# Patient Record
Sex: FEMALE | URBAN - METROPOLITAN AREA
[De-identification: names, ages, dates, MRNs, and addresses within clinical notes are randomized per-mention and may not be internally consistent; named-entity substitution may affect disease eponyms.]

---

## 2017-04-06 ASSESSMENT — ENCOUNTER SYMPTOMS
POOR WOUND HEALING: 0
DECREASED CONCENTRATION: 1
PANIC: 0
EYE PAIN: 0
DEPRESSION: 0
EYE IRRITATION: 0
INSOMNIA: 0
EYE WATERING: 0
NERVOUS/ANXIOUS: 1
EYE REDNESS: 1
SKIN CHANGES: 0
NAIL CHANGES: 0
DOUBLE VISION: 0

## 2017-04-06 ASSESSMENT — ANXIETY QUESTIONNAIRES
GAD7 TOTAL SCORE: 2
7. FEELING AFRAID AS IF SOMETHING AWFUL MIGHT HAPPEN: 0 = NOT AT ALL
GAD7 TOTAL SCORE: 2

## 2017-04-07 ASSESSMENT — ANXIETY QUESTIONNAIRES: GAD7 TOTAL SCORE: 2

## 2017-04-19 NOTE — PROGRESS NOTES
Alicia is a 51 year old female who presents for annual exam:   HPI:  Concerns:  - wants cholesterol checked:   - wt down: working out and watching diet  - Still worries about her cardiovascular health   Wt Readings from Last 5 Encounters:   17 70.1 kg (154 lb 8 oz)   16 74.1 kg (163 lb 6.4 oz)   16 75.8 kg (167 lb)   04/07/15 77.1 kg (170 lb)   ROS:  General: generally good health  Head/Eyes: none  Ears/Nose/Throat: eyes are alwaysdry  Cardiovascular: worries about cardiovascular healthRespiratory: none  Gastrointestinal: good when eating well   Breast: none  Genitourinary: none  Sexual Function: none  Musculoskeletal: leg are restless during the night  Skin: none  Neurological: none  Mental Health:Wakes with some anxiety. Brain does not process as well.     Endocrine: none  OB/GYN HISTORY:   LMP 3.5.. Few hot flashes.   D & C for uterine Polyps X 3.  Obstetric History       T4      TAB0   SAB0   E0   M0   L0       # Outcome Date GA Lbr Todd/2nd Weight Sex Delivery Anes PTL Lv   6 AB            5 AB            4 Term            3 Term            2 Term            1 Term               PAST MEDICAL HISTORY:  Past Medical History:   Diagnosis Date     SAMUEL (generalised anxiety disorder)      Herniated disc, cervical      TMJ arthralgia    Life Style Modifiers:   Tobacco:  reports that she has never smoked. She has never used smokeless tobacco.   Alcohol:  reports that she drinks alcohol.   Drug use:  reports that she does not use illicit drugs.  Exercise: trying - 3-4 times a week              Diet: good general diet.  Limits carbs/sugar.  HCM: GYN [[facility]]. Pap smear ~ negative 2015 will be seen again this year;  Mammogram 2016.   Supplements: vitamin D, Vitamin C  PAST SURGICAL HISTORY:  Past Surgical History:   Procedure Laterality Date     DILATION AND CURETTAGE      X3 for uterine polyp. , ,    FAMILY HISTORY:  No breast or ovarian disease  SOCIAL  "HISTORY:  Moved here in 2010 from [place].  Husbands work. 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 Living down [facility].  Four children  22,20, 18, 14. [three at home at this time].   MEDICATIONS:  Current Outpatient Prescriptions   Medication Sig Dispense Refill     tretinoin (RETIN-A) 0.025 % cream Start every third day, increase to every other day, and then every night as your skin tolerates 20 g 3   ALLERGIES:  Review of patient's allergies indicates no known allergies.  VITALS:  /79  Pulse 74  Ht 1.626 m (5' 4\")  Wt 70.1 kg (154 lb 8 oz)  LMP 03/05/2015  Breastfeeding? No  BMI 26.52 kg/m2  PHYSICAL EXAM:  Constitutional: Well appearing woman in no acute distress.   Psychological: appropriate mood.  Eyes: anicteric, normal extra-ocular movements.  Ears, Nose and Throat: tympanic membranes clear, nose clear and free of lesions, throat clear, moist mucous membrames, neck supple with full range of motion.    Neck: No thyroidmegaly.  Breast: symmetrical without abnormal masses and no nipple changes. No axillary lymphadenapthy  Cardiovascular: regular rate and rhythm, normal S1 and S2, no murmurs, rubs or gallops, peripheral pulses full and symmetric   Respiratory: clear to auscultation, no wheezes or crackles, normal breath sounds.  Musculoskeletal: full range of motion    Skin: no concerning lesions, no jaundice.  Neurological: normal gait, no tremor.   Diagnoses and associated orders for this visit:  Well woman exam without gynaecological exam  - Mammogram next month is scheduled  - Pelvic exam with GYN -   [[facility]]  - Immunizations up to date   - Weight management and exercise discussed - goal  <150  Postmenopausal:   - Vitamin D 1-2000 IU daily  - PAP/HPV in 12/2016 at GYN  Screening for diabetes mellitus  Orders:  -     Basic Metabolic Panel  Elevated cholesterol/Hyperlipidemia  Orders:  -     Lipid Panel  -     REFERRAL TO [facility] FOR CARDIOVASCULAR DISEASE " PREVN

## 2017-04-20 ENCOUNTER — OFFICE VISIT (OUTPATIENT)
Dept: FAMILY MEDICINE | Facility: CLINIC | Age: 52
End: 2017-04-20
Attending: FAMILY MEDICINE
Payer: COMMERCIAL

## 2017-04-20 ENCOUNTER — RADIANT APPOINTMENT (OUTPATIENT)
Dept: MAMMOGRAPHY | Facility: CLINIC | Age: 52
End: 2017-04-20
Payer: COMMERCIAL

## 2017-04-20 VITALS
HEIGHT: 64 IN | DIASTOLIC BLOOD PRESSURE: 79 MMHG | WEIGHT: 154.5 LBS | HEART RATE: 74 BPM | BODY MASS INDEX: 26.38 KG/M2 | SYSTOLIC BLOOD PRESSURE: 118 MMHG

## 2017-04-20 DIAGNOSIS — Z00.00 ANNUAL PHYSICAL EXAM: Primary | ICD-10-CM

## 2017-04-20 DIAGNOSIS — Z12.31 VISIT FOR SCREENING MAMMOGRAM: ICD-10-CM

## 2017-04-20 DIAGNOSIS — Z13.1 SCREENING FOR DIABETES MELLITUS: ICD-10-CM

## 2017-04-20 DIAGNOSIS — E78.5 HYPERLIPIDEMIA LDL GOAL <130: ICD-10-CM

## 2017-04-20 LAB
ANION GAP SERPL CALCULATED.3IONS-SCNC: 5 MMOL/L (ref 3–14)
BUN SERPL-MCNC: 9 MG/DL (ref 7–30)
CALCIUM SERPL-MCNC: 9.2 MG/DL (ref 8.5–10.1)
CHLORIDE SERPL-SCNC: 104 MMOL/L (ref 94–109)
CHOLEST SERPL-MCNC: 235 MG/DL
CO2 SERPL-SCNC: 33 MMOL/L (ref 20–32)
CREAT SERPL-MCNC: 0.64 MG/DL (ref 0.52–1.04)
GFR SERPL CREATININE-BSD FRML MDRD: ABNORMAL ML/MIN/1.7M2
GLUCOSE SERPL-MCNC: 91 MG/DL (ref 70–99)
HDLC SERPL-MCNC: 87 MG/DL
LDLC SERPL CALC-MCNC: 134 MG/DL
NONHDLC SERPL-MCNC: 148 MG/DL
POTASSIUM SERPL-SCNC: 3.5 MMOL/L (ref 3.4–5.3)
SODIUM SERPL-SCNC: 142 MMOL/L (ref 133–144)
TRIGL SERPL-MCNC: 70 MG/DL

## 2017-04-20 PROCEDURE — 80061 LIPID PANEL: CPT | Performed by: FAMILY MEDICINE

## 2017-04-20 PROCEDURE — 99213 OFFICE O/P EST LOW 20 MIN: CPT | Mod: ZF

## 2017-04-20 PROCEDURE — 80048 BASIC METABOLIC PNL TOTAL CA: CPT | Performed by: FAMILY MEDICINE

## 2017-04-20 PROCEDURE — 36415 COLL VENOUS BLD VENIPUNCTURE: CPT | Performed by: FAMILY MEDICINE

## 2017-04-20 PROCEDURE — G0202 SCR MAMMO BI INCL CAD: HCPCS

## 2017-04-20 ASSESSMENT — PAIN SCALES - GENERAL: PAINLEVEL: NO PAIN (0)

## 2017-04-20 NOTE — MR AVS SNAPSHOT
After Visit Summary   4/20/2017    Alicia Rivas    MRN: [id number]           Patient Information     Date Of Birth          1965        Visit Information        Provider Department      4/20/2017 8:00 AM Chari Luna MD [facility]        Today's Diagnoses     Annual physical exam    -  1    Screening for diabetes mellitus        Hyperlipidemia LDL goal <130           Follow-ups after your visit        Additional Services     REFERRAL TO [facility] FOR CARDIOVASCULAR DISEASE PREVN       Your provider has referred you to the [facility].    To schedule your appointment: The [facility] Central Scheduling Center will call you to schedule your appointment.    Please be aware that coverage of these services is subject to the terms and limitations of your health insurance plan.  Call member services at your health plan with any benefit or coverage questions.      Please bring the following with you to your appointment:    (1) Any X-Rays, CTs or MRIs which have been performed.  Contact the facility where they were done to arrange for  prior to your scheduled appointment.  Any new CT, MRI or other procedures ordered by your specialist must be performed at a [facility] facility or coordinated by your clinic's referral office.    (2) List of current medications   (3) This referral request   (4) Any documents/labs given to you for this referral                  Your next 10 appointments already scheduled     May 30, 2017  8:00 AM CDT   MA SCREENING DIGITAL BILATERAL with [id number]   [facility] ([facility])    [address] [place] [address], [address]  [facility] [address] [phone]           Do not use any powder, lotion or deodorant under your arms or on your breast. If you do, we will ask you to remove it before your exam.  Wear comfortable, two-piece clothing.  If you have any allergies, tell your care team.   "Bring any previous mammograms from other facilities or have them mailed to the breast center.              Who to contact     Please call your clinic at [phone] to:    Ask questions about your health    Make or cancel appointments    Discuss your medicines    Learn about your test results    Speak to your doctor   If you have compliments or concerns about an experience at your clinic, or if you wish to file a complaint, please contact [facility] Patient Relations at [phone] or email us at [email or web address]         Additional Information About Your Visit        [employer] Information     [employer] gives you secure access to your electronic health record. If you see a primary care provider, you can also send messages to your care team and make appointments. If you have questions, please call your primary care clinic.  If you do not have a primary care provider, please call [phone] and they will assist you.      [employer] is an electronic gateway that provides easy, online access to your medical records. With [employer], you can request a clinic appointment, read your test results, renew a prescription or communicate with your care team.     To access your existing account, please contact your [facility] or call [phone] for assistance.        Care EveryWhere ID     This is your Care EveryWhere ID. This could be used by other organizations to access your [facility] medical records  [id number]        Your Vitals Were     Pulse Height Last Period Breastfeeding? BMI (Body Mass Index)       74 1.626 m (5' 4\") 03/05/2015 No 26.52 kg/m2        Blood Pressure from Last 3 Encounters:   04/20/17 118/79   04/19/16 111/71   03/07/16 116/73    Weight from Last 3 Encounters:   04/20/17 70.1 kg (154 lb 8 oz)   04/19/16 74.1 kg (163 lb 6.4 oz)   03/07/16 75.8 kg (167 lb)              We Performed the Following     Basic Metabolic Panel     Lipid Profile  "    REFERRAL TO [facility] FOR CARDIOVASCULAR DISEASE PREV        Primary Care Provider    Pcp Unknown Verified       No address on file        Thank you!     Thank you for choosing [facility]  for your care. Our goal is always to provide you with excellent care. Hearing back from our patients is one way we can continue to improve our services. Please take a few minutes to complete the written survey that you may receive in the mail after your visit with us. Thank you!             Your Updated Medication List - Protect others around you: Learn how to safely use, store and throw away your medicines at www.disposemymeds.org.          This list is accurate as of: 4/20/17  8:37 AM.  Always use your most recent med list.                   Brand Name Dispense Instructions for use    tretinoin 0.025 % cream    RETIN-A    20 g    Start every third day, increase to every other day, and then every night as your skin tolerates

## 2017-04-20 NOTE — LETTER
2017       RE: Alicia Rivas  [address]  [place] [address]     Dear Colleague,    Thank you for referring your patient, Alicia Rivas, to the [facility] at [facility]. Please see a copy of my visit note below.    Alicia is a 51 year old female who presents for annual exam:   HPI:  Concerns:  - wants cholesterol checked:   - wt down: working out and watching diet  - Still worries about her cardiovascular health   Wt Readings from Last 5 Encounters:   17 70.1 kg (154 lb 8 oz)   16 74.1 kg (163 lb 6.4 oz)   16 75.8 kg (167 lb)   04/07/15 77.1 kg (170 lb)   ROS:  General: generally good health  Head/Eyes: none  Ears/Nose/Throat: eyes are alwaysdry  Cardiovascular: worries about cardiovascular healthRespiratory: none  Gastrointestinal: good when eating well   Breast: none  Genitourinary: none  Sexual Function: none  Musculoskeletal: leg are restless during the night  Skin: none  Neurological: none  Mental Health:Wakes with some anxiety. Brain does not process as well.     Endocrine: none  OB/GYN HISTORY:   LMP 3.5.2015. Few hot flashes.   D & C for uterine Polyps X 3.  Obstetric History       T4      TAB0   SAB0   E0   M0   L0       # Outcome Date GA Lbr Todd/2nd Weight Sex Delivery Anes PTL Lv   6 AB            5 AB            4 Term            3 Term            2 Term            1 Term               PAST MEDICAL HISTORY:  Past Medical History:   Diagnosis Date     SAMUEL (generalised anxiety disorder)      Herniated disc, cervical      TMJ arthralgia    Life Style Modifiers:   Tobacco:  reports that she has never smoked. She has never used smokeless tobacco.   Alcohol:  reports that she drinks alcohol.   Drug use:  reports that she does not use illicit drugs.  Exercise: trying - 3-4 times a week              Diet: good general diet.  Limits carbs/sugar.  HCM: GYN [[facility]]. Pap smear ~  "negative 12/2015 will be seen again this year;  Mammogram 4/2016.   Supplements: vitamin D, Vitamin C  PAST SURGICAL HISTORY:  Past Surgical History:   Procedure Laterality Date     DILATION AND CURETTAGE      X3 for uterine polyp. 2001, 2008, 2014   FAMILY HISTORY:  No breast or ovarian disease  SOCIAL HISTORY:  Moved here in 2010 from [place].  Husbands work. 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 Living down [facility].  Four children  22,20, 18, 14. [three at home at this time].   MEDICATIONS:  Current Outpatient Prescriptions   Medication Sig Dispense Refill     tretinoin (RETIN-A) 0.025 % cream Start every third day, increase to every other day, and then every night as your skin tolerates 20 g 3   ALLERGIES:  Review of patient's allergies indicates no known allergies.  VITALS:  /79  Pulse 74  Ht 1.626 m (5' 4\")  Wt 70.1 kg (154 lb 8 oz)  LMP 03/05/2015  Breastfeeding? No  BMI 26.52 kg/m2  PHYSICAL EXAM:  Constitutional: Well appearing woman in no acute distress.   Psychological: appropriate mood.  Eyes: anicteric, normal extra-ocular movements.  Ears, Nose and Throat: tympanic membranes clear, nose clear and free of lesions, throat clear, moist mucous membrames, neck supple with full range of motion.    Neck: No thyroidmegaly.  Breast: symmetrical without abnormal masses and no nipple changes. No axillary lymphadenapthy  Cardiovascular: regular rate and rhythm, normal S1 and S2, no murmurs, rubs or gallops, peripheral pulses full and symmetric   Respiratory: clear to auscultation, no wheezes or crackles, normal breath sounds.  Musculoskeletal: full range of motion    Skin: no concerning lesions, no jaundice.  Neurological: normal gait, no tremor.   Diagnoses and associated orders for this visit:  Well woman exam without gynaecological exam  - Mammogram next month is scheduled  - Pelvic exam with GYN -   [[facility]]  - Immunizations up to date   - Weight management and " exercise discussed - goal  <150  Postmenopausal:   - Vitamin D 1-2000 IU daily  - PAP/HPV in 12/2016 at GYN  Screening for diabetes mellitus  Orders:  -     Basic Metabolic Panel  Elevated cholesterol/Hyperlipidemia  Orders:  -     Lipid Panel  -     REFERRAL TO [facility] FOR CARDIOVASCULAR DISEASE PREVN    Again, thank you for allowing me to participate in the care of your patient.      Sincerely,    Chari Luna MD

## 2017-05-12 DIAGNOSIS — E78.5 HYPERLIPIDEMIA LDL GOAL <100: Primary | ICD-10-CM

## 2017-05-15 ASSESSMENT — ENCOUNTER SYMPTOMS
ARTHRALGIAS: 0
EYE IRRITATION: 1
ORTHOPNEA: 0
SLEEP DISTURBANCES DUE TO BREATHING: 0
NECK PAIN: 1
EYE WATERING: 0
CLAUDICATION: 0
DOUBLE VISION: 0
TACHYCARDIA: 0
SYNCOPE: 0
MUSCLE CRAMPS: 1
MYALGIAS: 0
EYE REDNESS: 1
EXERCISE INTOLERANCE: 0
STIFFNESS: 0
MUSCLE WEAKNESS: 0
JOINT SWELLING: 0
LIGHT-HEADEDNESS: 0
LEG SWELLING: 0
BACK PAIN: 1
PALPITATIONS: 0
HYPERTENSION: 0
EYE PAIN: 0
LEG PAIN: 1
HYPOTENSION: 0

## 2017-05-18 ENCOUNTER — OFFICE VISIT (OUTPATIENT)
Dept: CARDIOLOGY | Facility: CLINIC | Age: 52
End: 2017-05-18

## 2017-05-18 VITALS
WEIGHT: 155 LBS | HEIGHT: 64 IN | SYSTOLIC BLOOD PRESSURE: 110 MMHG | DIASTOLIC BLOOD PRESSURE: 74 MMHG | HEART RATE: 66 BPM | BODY MASS INDEX: 26.46 KG/M2 | OXYGEN SATURATION: 98 %

## 2017-05-18 DIAGNOSIS — E78.5 HYPERLIPIDEMIA LDL GOAL <100: Primary | ICD-10-CM

## 2017-05-18 DIAGNOSIS — E78.5 HYPERLIPIDEMIA LDL GOAL <100: ICD-10-CM

## 2017-05-18 LAB
CREAT UR-MCNC: 96 MG/DL
CRP SERPL HS-MCNC: 1.8 MG/L
FEF 25/75: NORMAL
FEV-1: NORMAL
FEV1/FVC: NORMAL
FVC: NORMAL
INTERPRETATION ECG - MUSE: NORMAL
MICROALBUMIN UR-MCNC: 6 MG/L
MICROALBUMIN/CREAT UR: 6.34 MG/G CR (ref 0–25)
NT-PROBNP SERPL-MCNC: 50 PG/ML (ref 0–125)

## 2017-05-18 NOTE — LETTER
"5/18/2017      RE: Alicia Rivas  [address]  [place] [address]       Dear Colleague,    Thank you for the opportunity to participate in the care of your patient, Alicia Rivas, at the [facility] FOR CARDIOVASCULAR DISEASE PREVENTION at [facility]. Please see a copy of my visit note below.      [facility] for Cardiovascular Disease Prevention - Exam Note    Active Problems   Patient Active Problem List    Diagnosis Date Noted     Hyperlipidemia LDL goal <100 05/18/2017     Priority: Medium       Reason For Visit   Patient here for [facility] early detection of atherosclerosis and CVD exam.    Pain Evaluation  Current history of pain associated with this visit is: denied    HPI   Alicia Rivas is a 51 year old year old female with a history of hyperlipidemia identified two years ago with LDL elevation to  171 with above average HDL up to 93.  At the time she was following the whole thirty eating plan high in meat and eggs. Since that time she has reduced her intake of meat and eggs with a reduction in her LDL to 134. She it not taking any lipid medications. She had atypical chest pain symptoms about one year ago (non exertional, feeling of chest weakness for 24 hours) which was evaluated with a graded exercise test with normal result without ischemia.    She is concerned about her CV risk because her father had a 4 vessel CAB at age 71 but with a hx of angina many years prior.    Nutrition assessment per patient report:   We reviewed \"my plate\" nutrition recommendations. She works to avoid high sugar foods and has recently reduced her meat intake. Her TMJ symptoms interestingly improved with reduction of red meat, chew ice and gum. We discussed increasing foods rich in calcium like milk. She is taking a vitamin D supplement but could not recall the dose.  Foods with fat/cholesterol (fried foods, fatty meats, junk food):  1 " serving per day   Fruits and vegetables (  cup cooked, 1 cup raw):  4 servings per day  Caffeine (1 cup coffee, soda, etc):  0 servings  Alcohol servings (12 oz. beer, 4 oz. wine, 1  oz. in mixed drink):  2 servings per month  Calcium servings (dairy foods, 8 oz. milk, yogurt, cheese, ice cream): 1- 2 servings per day  Salt/sodium use:  light use  Special dietary habits:  1600 calori diet, she follows 1200 when she is working to reduce her weight    Activity  Patient is no recently very active with moving her home and starting a new job in the last 3 months. We discussed adding back her past routine of running or walking and yoga for strength training and flexibility.    Laboratory Results Review  We discussed laboratory results today including lipids targets and how foods influence cholesterol.    Weight  Her perceived healthy weight is 145 pounds.  A normal BMI of 25 is equal to 145 pounds.  The current BMI of 26.6 is overweight range.  A weight reduction speed of 1-2 lbs per month for women is recommended.    PMH   Past Medical History:   Diagnosis Date     Abnormal Pap smear      SAMUEL (generalised anxiety disorder)      Herniated disc, cervical      Hyperlipidemia 2015     TMJ arthralgia        PSH  Past Surgical History:   Procedure Laterality Date     DILATION AND CURETTAGE      X3 for uterine polyp. 2001, 2008, 2014       Current Meds   Current Outpatient Prescriptions   Medication Sig Dispense Refill     VITAMIN D, CHOLECALCIFEROL, PO Take 1 tablet by mouth daily         Allergies      Allergies   Allergen Reactions     Tretinoin Other (See Comments)     Bad dreams       Family Hx   Family History   Problem Relation Age of Onset     Skin Cancer Mother      Substance Abuse Mother      alcohol, in recovery     Depression Mother      OSTEOPOROSIS Mother      Asthma Mother      Skin Cancer Father      Coronary Artery Disease Father 60     quadruple bypass at 65     MENTAL ILLNESS Father      undiagnosed, onset  "early 50's     Substance Abuse Father      alcohol     Prostate Cancer Maternal Grandfather      Coronary Artery Disease Maternal Grandfather      Other - See Comments Sister      cranial fascial malformation     Alcoholism Paternal Grandmother      Neurologic Disorder Sister      cerebral palsy     CANCER Paternal Grandfather        Social History  Alicia is  and is working full time. Her job is [employer]  She is  with 4 children. 4 children are over 18 years of age, and 1 child is 5-18 years of age.     Enjoyment of life is 9 with 10 enjoys life fully.    Tobacco History  History   Smoking Status     Never Smoker   Smokeless Tobacco     Never Used       ROS  CONSTITUTIONAL:  No fever, chills, or sweats. No weight gain/loss.   EENT:  No visual disturbance, ear ache, epistaxis, sore throat  ALLERGIES/IMMUNOLOGIC:  Negative  RESPIRATORY:  No cough, hemoptysis  CARDIOVASCULAR:  As per HPI  GI:  No nausea, vomiting, hematemesis, melena  :  No urinary frequency, dysuria, or hematuria  INTEGUMENT:  Negative  PSYCHIATRIC:  Negative  NEURO:  Negative  ENDOCRINE:  Negative  MUSCULOSKELETAL:  Negative  MUSCULOSKELETAL:  Some back pain     Vital Signs   /74 (BP Location: Left arm, Patient Position: Chair, Cuff Size: Adult Regular)  Pulse 66  Ht 1.626 m (5' 4\")  Wt 70.3 kg (155 lb)  LMP 03/05/2015  SpO2 98%  BMI 26.61 kg/m2      Waist: 33 inches  Hip: 43 inches    Physical Exam   In general, the patient is a pleasant female in no apparent distress.    HEENT: NC/AT.  PERRLA.  EOMI.  Sclerae white, not injected.  Nares clear.  Pharynx without erythema or exudate.  Dentition intact.    Neck: No adenopathy.  No thyromegaly.Carotids +4/4 bilaterally without bruits.  No jugular venous distension.   Lungs: CTA.  No ronchi, wheezes, rales.     Cor: RRR. Normal S1, S2 splits physiologically. No murmur, rub, click, or gallop. The PMI is in the 5th ICS in the midclavicular line. There is no heave. "   Abdomen: Soft, nontender, nondistended. No organomegaly.  No bruits.   Extremities: No clubbing, cyanosis, or edema. The pulses are +2/2 at the post-tibial sites bilaterally. No bruits are noted.    Recent Labs  Lab Results   Component Value Date    GLC 91 04/20/2017      Lab Results   Component Value Date    NTBNP 50 05/18/2017     No results found for: NTBNPI   Lab Results   Component Value Date    UCRR 96 05/18/2017      Lab Results   Component Value Date    MICROL 6 05/18/2017      No results found for: MICROALBUMIN   No results found for: CRP   Lab Results   Component Value Date    CHOL 235 (H) 04/20/2017      Lab Results   Component Value Date    TRIG 70 04/20/2017      Lab Results   Component Value Date    HDL 87 04/20/2017      Lab Results   Component Value Date     (H) 04/20/2017      Lab Results   Component Value Date    VLDL 14 04/08/2015      Lab Results   Component Value Date    CHOLHDLRATIO 2.9 04/08/2015     Lab Results   Component Value Date    NHDL 148 (H) 04/20/2017          [facility] Test Results    BASIC SPIROMETRY: Summary of two attempts (see printout for details of results)  Results Estimated range for ht/age   FVC: 3.10 liter FVC: 2.87-4.25 liter   FEV1: 2.57 liter FEV1: 2.24-3.41 liter     History of asthma:  NO   History of respiratory infection current/recent:  NO     Spirometry Results:  normal      WALKING BLOOD PRESSURE RESPONSE (3 minute, 5 MET level walk)   Pre BP: 90/62 mmHg  3 min BP: 102/50 mmHg  1 min post BP: 102/50 mmHg    Pre HR: 62 bpm  3 min HR: 126 bpm  1 min post HR: 66 bpm       RETINAL VASCULAR ASSESSMENT   Left Eye Abnormality:  none  AV Ratio: 0.82    Right Eye Abnormality:  none  AV Ratio: 0.84     Retinal Assessment:  normal      ABDOMINAL AORTA ULTRASOUND (< 2.5 normal, borderline 2.5-2.9, abnormal > 3)   SupraIliac 1.35 cm    SupraRenal 1.63 cm    InfraRenal Proximal 1.54 cm    InfraRenal Distal 1.73 cm      Abdominal Aorta Assessment:  normal      LEFT  VENTRICULAR ULTRASOUND MEASUREMENTS (adjusted for BSA)  LVIDD 45.3 mm   Septa 8.8 mm   Posterior 10.5 mm     Left Ventricular US Assessment:  normal      Carotid Artery IMT measurements report and plaques in the small area examined:   Left IMT 0.639 mm  Plaques none    Right IMT 0.519 mm  Plaques none       ECG (see tracing):  normal sinus rhythm;  rate: 60 bpm      Arterial Elasticity per age and gender (see printout):   C1 20.5 mL/mmHg x 10  normal   C2 13 mL/mmHg x 100 normal   Supine blood pressure: 105/68 mmHg       Assessment:     Cardiovascular:  ECG sinus rhythm rate 60 without ectopy. No recent chest pain or shortness of breath. Chest pain one year ago was atypical with negative GXT. Nt pro BNP is normal.    Blood Pressure:  No hx of hypertension. Artery elasticity normal both large and small.      Lipids:  Elevated LDL up to 171 reduced to 134 with reduction of red meat. HDL is above average at 87, normal triglycerides without lipid medication.     Health Habit Summary:  Nutrition: Heart Healthy Eating:  most of the time   Exercise:  not regularly active last 3 mo recommend increasing activity level with walking, strength training once per week and stretching program. [place] with a gym available.  Weight:  overweight range  Tobacco Use:  never used    Full report to follow prevention team review of test results with scanned final report.    Time spent for patient visit was 60 minutes with more than half the time spent on counseling and coordination of care.    SOFYA Castro CNP       CC  Patient Care Team:  Verified, Pcp Unknown as PCP - General  Justice Orellana MD as MD (Family Practice)  Kerry Sosa MD PhD as MD (Family Practice)  Kourtney Canchola APRN CNP as Nurse Practitioner (Nurse Practitioner)  JUSTICE ORELLANA

## 2017-05-18 NOTE — PROGRESS NOTES
"  [facility] Prevention - Exam Note    Active Problems   Patient Active Problem List    Diagnosis Date Noted     Hyperlipidemia LDL goal <100 05/18/2017     Priority: Medium       Reason For Visit   Patient here for [facility] early detection of atherosclerosis and CVD exam.    Pain Evaluation  Current history of pain associated with this visit is: denied    HPI   Alicia Rivas is a 51 year old year old female with a history of hyperlipidemia identified two years ago with LDL elevation to  171 with above average HDL up to 93.  At the time she was following the whole thirty eating plan high in meat and eggs. Since that time she has reduced her intake of meat and eggs with a reduction in her LDL to 134. She it not taking any lipid medications. She had atypical chest pain symptoms about one year ago (non exertional, feeling of chest weakness for 24 hours) which was evaluated with a graded exercise test with normal result without ischemia.    She is concerned about her CV risk because her father had a 4 vessel CAB at age 71 but with a hx of angina many years prior.    Nutrition assessment per patient report:   We reviewed \"my plate\" nutrition recommendations. She works to avoid high sugar foods and has recently reduced her meat intake. Her TMJ symptoms interestingly improved with reduction of red meat, chew ice and gum. We discussed increasing foods rich in calcium like milk. She is taking a vitamin D supplement but could not recall the dose.  Foods with fat/cholesterol (fried foods, fatty meats, junk food):  1 serving per day   Fruits and vegetables (  cup cooked, 1 cup raw):  4 servings per day  Caffeine (1 cup coffee, soda, etc):  0 servings  Alcohol servings (12 oz. beer, 4 oz. wine, 1  oz. in mixed drink):  2 servings per month  Calcium servings (dairy foods, 8 oz. milk, yogurt, cheese, ice cream): 1- 2 servings per day  Salt/sodium use:  light use  Special dietary habits:  " 1600 calori diet, she follows 1200 when she is working to reduce her weight    Activity  Patient is no recently very active with moving her home and starting a new job in the last 3 months. We discussed adding back her past routine of running or walking and yoga for strength training and flexibility.    Laboratory Results Review  We discussed laboratory results today including lipids targets and how foods influence cholesterol.    Weight  Her perceived healthy weight is 145 pounds.  A normal BMI of 25 is equal to 145 pounds.  The current BMI of 26.6 is overweight range.  A weight reduction speed of 1-2 lbs per month for women is recommended.    PMH   Past Medical History:   Diagnosis Date     Abnormal Pap smear      SAMUEL (generalised anxiety disorder)      Herniated disc, cervical      Hyperlipidemia 2015     TMJ arthralgia        PSH  Past Surgical History:   Procedure Laterality Date     DILATION AND CURETTAGE      X3 for uterine polyp. 2001, 2008, 2014       Current Meds   Current Outpatient Prescriptions   Medication Sig Dispense Refill     VITAMIN D, CHOLECALCIFEROL, PO Take 1 tablet by mouth daily         Allergies      Allergies   Allergen Reactions     Tretinoin Other (See Comments)     Bad dreams       Family Hx   Family History   Problem Relation Age of Onset     Skin Cancer Mother      Substance Abuse Mother      alcohol, in recovery     Depression Mother      OSTEOPOROSIS Mother      Asthma Mother      Skin Cancer Father      Coronary Artery Disease Father 60     quadruple bypass at 65     MENTAL ILLNESS Father      undiagnosed, onset early 50's     Substance Abuse Father      alcohol     Prostate Cancer Maternal Grandfather      Coronary Artery Disease Maternal Grandfather      Other - See Comments Sister      cranial fascial malformation     Alcoholism Paternal Grandmother      Neurologic Disorder Sister      cerebral palsy     CANCER Paternal Grandfather        Social History  Alicia is   "and is working full time. Her job is [employer].  She is  with 4 children. 4 children are over 18 years of age, and 1 child is 5-18 years of age.     Enjoyment of life is 9 with 10 enjoys life fully.    Tobacco History  History   Smoking Status     Never Smoker   Smokeless Tobacco     Never Used       ROS  CONSTITUTIONAL:  No fever, chills, or sweats. No weight gain/loss.   EENT:  No visual disturbance, ear ache, epistaxis, sore throat  ALLERGIES/IMMUNOLOGIC:  Negative  RESPIRATORY:  No cough, hemoptysis  CARDIOVASCULAR:  As per HPI  GI:  No nausea, vomiting, hematemesis, melena  :  No urinary frequency, dysuria, or hematuria  INTEGUMENT:  Negative  PSYCHIATRIC:  Negative  NEURO:  Negative  ENDOCRINE:  Negative  MUSCULOSKELETAL:  Negative  MUSCULOSKELETAL:  Some back pain     Vital Signs   /74 (BP Location: Left arm, Patient Position: Chair, Cuff Size: Adult Regular)  Pulse 66  Ht 1.626 m (5' 4\")  Wt 70.3 kg (155 lb)  LMP 03/05/2015  SpO2 98%  BMI 26.61 kg/m2      Waist: 33 inches  Hip: 43 inches    Physical Exam   In general, the patient is a pleasant female in no apparent distress.    HEENT: NC/AT.  PERRLA.  EOMI.  Sclerae white, not injected.  Nares clear.  Pharynx without erythema or exudate.  Dentition intact.    Neck: No adenopathy.  No thyromegaly.Carotids +4/4 bilaterally without bruits.  No jugular venous distension.   Lungs: CTA.  No ronchi, wheezes, rales.     Cor: RRR. Normal S1, S2 splits physiologically. No murmur, rub, click, or gallop. The PMI is in the 5th ICS in the midclavicular line. There is no heave.   Abdomen: Soft, nontender, nondistended. No organomegaly.  No bruits.   Extremities: No clubbing, cyanosis, or edema. The pulses are +2/2 at the post-tibial sites bilaterally. No bruits are noted.    Recent Labs  Lab Results   Component Value Date    GLC 91 04/20/2017      Lab Results   Component Value Date    NTBNP 50 05/18/2017     No results found for: NTBNPI   Lab Results "   Component Value Date    UCRR 96 05/18/2017      Lab Results   Component Value Date    MICROL 6 05/18/2017      No results found for: MICROALBUMIN   No results found for: CRP   Lab Results   Component Value Date    CHOL 235 (H) 04/20/2017      Lab Results   Component Value Date    TRIG 70 04/20/2017      Lab Results   Component Value Date    HDL 87 04/20/2017      Lab Results   Component Value Date     (H) 04/20/2017      Lab Results   Component Value Date    VLDL 14 04/08/2015      Lab Results   Component Value Date    CHOLHDLRATIO 2.9 04/08/2015     Lab Results   Component Value Date    NHDL 148 (H) 04/20/2017          [facility] Test Results    BASIC SPIROMETRY: Summary of two attempts (see printout for details of results)  Results Estimated range for ht/age   FVC: 3.10 liter FVC: 2.87-4.25 liter   FEV1: 2.57 liter FEV1: 2.24-3.41 liter     History of asthma:  NO   History of respiratory infection current/recent:  NO     Spirometry Results:  normal      WALKING BLOOD PRESSURE RESPONSE (3 minute, 5 MET level walk)   Pre BP: 90/62 mmHg  3 min BP: 102/50 mmHg  1 min post BP: 102/50 mmHg    Pre HR: 62 bpm  3 min HR: 126 bpm  1 min post HR: 66 bpm       RETINAL VASCULAR ASSESSMENT   Left Eye Abnormality:  none  AV Ratio: 0.82    Right Eye Abnormality:  none  AV Ratio: 0.84     Retinal Assessment:  normal      ABDOMINAL AORTA ULTRASOUND (< 2.5 normal, borderline 2.5-2.9, abnormal > 3)   SupraIliac 1.35 cm    SupraRenal 1.63 cm    InfraRenal Proximal 1.54 cm    InfraRenal Distal 1.73 cm      Abdominal Aorta Assessment:  normal      LEFT VENTRICULAR ULTRASOUND MEASUREMENTS (adjusted for BSA)  LVIDD 45.3 mm   Septa 8.8 mm   Posterior 10.5 mm     Left Ventricular US Assessment:  normal      Carotid Artery IMT measurements report and plaques in the small area examined:   Left IMT 0.639 mm  Plaques none    Right IMT 0.519 mm  Plaques none       ECG (see tracing):  normal sinus rhythm;  rate: 60 bpm      Arterial  Elasticity per age and gender (see printout):   C1 20.5 mL/mmHg x 10  normal   C2 13 mL/mmHg x 100 normal   Supine blood pressure: 105/68 mmHg       Assessment:     Cardiovascular:  ECG sinus rhythm rate 60 without ectopy. No recent chest pain or shortness of breath. Chest pain one year ago was atypical with negative GXT. Nt pro BNP is normal.    Blood Pressure:  No hx of hypertension. Artery elasticity normal both large and small.      Lipids:  Elevated LDL up to 171 reduced to 134 with reduction of red meat. HDL is above average at 87, normal triglycerides without lipid medication.     Health Habit Summary:  Nutrition: Heart Healthy Eating:  most of the time   Exercise:  not regularly active last 3 mo recommend increasing activity level with walking, strength training once per week and stretching program. She [place] rise with a gym available.  Weight:  overweight range  Tobacco Use:  never used    Full report to follow prevention team review of test results with scanned final report.    Time spent for patient visit was 60 minutes with more than half the time spent on counseling and coordination of care.    SOFYA Castro CNP       CC  Patient Care Team:  Verified, Pcp Unknown as PCP - General  Justice Orellana MD as MD (Family Practice)  Kerry Sosa MD PhD as MD (Family Practice)  Kourtney Canchola APRN CNP as Nurse Practitioner (Nurse Practitioner)  JUSTICE ORELLANA  Answers for HPI/ROS submitted by the patient on 5/15/2017   General Symptoms: No  Skin Symptoms: No  HENT Symptoms: No  EYE SYMPTOMS: Yes  HEART SYMPTOMS: Yes  LUNG SYMPTOMS: No  INTESTINAL SYMPTOMS: No  URINARY SYMPTOMS: No  GYNECOLOGIC SYMPTOMS: No  BREAST SYMPTOMS: No  SKELETAL SYMPTOMS: Yes  BLOOD SYMPTOMS: No  NERVOUS SYSTEM SYMPTOMS: No  MENTAL HEALTH SYMPTOMS: No  Eye pain: No  Vision loss: No  Dry eyes: Yes  Watery eyes: No  Eye bulging: No  Double vision: No  Flashing of lights: No  Spots: No  Floaters:  Yes  Redness: Yes  Crossed eyes: No  Tunnel Vision: No  Yellowing of eyes: No  Eye irritation: Yes  Chest pain or pressure: No  Fast or irregular heartbeat: No  Pain in legs with walking: Yes  Swelling in feet or ankles: No  Trouble breathing while lying down: No  Fingers or Toes appear blue: No  High blood pressure: No  Low blood pressure: No  Fainting: No  Murmurs: No  Chest pain on exertion: No  Chest pain at rest: No  Cramping pain in leg during exercise: No  Pacemaker: No  Varicose veins: No  Edema or swelling: No  Fast heart beat: No  Wake up at night with shortness of breath: No  Heart flutters: No  Light-headedness: No  Exercise intolerance: No  Back pain: Yes  Muscle aches: No  Neck pain: Yes  Swollen joints: No  Joint pain: No  Bone pain: No  Muscle cramps: Yes  Muscle weakness: No  Joint stiffness: No  Bone fracture: No

## 2017-05-25 ENCOUNTER — TRANSFERRED RECORDS (OUTPATIENT)
Dept: HEALTH INFORMATION MANAGEMENT | Facility: CLINIC | Age: 52
End: 2017-05-25

## 2017-08-19 ENCOUNTER — HEALTH MAINTENANCE LETTER (OUTPATIENT)
Age: 52
End: 2017-08-19

## 2017-11-14 ENCOUNTER — OFFICE VISIT (OUTPATIENT)
Dept: DERMATOLOGY | Facility: CLINIC | Age: 52
End: 2017-11-14

## 2017-11-14 DIAGNOSIS — D22.9 MULTIPLE NEVI: ICD-10-CM

## 2017-11-14 DIAGNOSIS — L72.0 MILIA: Primary | ICD-10-CM

## 2017-11-14 ASSESSMENT — PAIN SCALES - GENERAL: PAINLEVEL: NO PAIN (0)

## 2017-11-14 NOTE — NURSING NOTE
Dermatology Rooming Note    Alicia Rivas's goals for this visit include:   Chief Complaint   Patient presents with     Skin Check     Will is here today for a skin check. No concerns.      MIMA Dorantes

## 2017-11-14 NOTE — LETTER
11/14/2017       RE: Alicia Rivas  [address]  [place] [address]     Dear Colleague,    Thank you for referring your patient, Alicia Rivas, to the [facility] DERMATOLOGY [facility]. Please see a copy of my visit note below.    [facility] Dermatology Note      Dermatology Problem List:  1.Family history of SCC and BCC    Encounter Date: Nov 14, 2017    CC:  Chief Complaint   Patient presents with     Skin Check     Will is here today for a skin check. No concerns.          History of Present Illness:  Ms. Alicia Rivas is a 52 year old female who presents for a skin check. She has no concerns today but requested to have a nodule on her chin and the moles under her right breast examined. She had several gel manicures this summer which caused her nails to split, which has since resolved. She reports pain in her right elbow associated with overuse. Denies recent fevers, chills, rashes, cough, or joint swelling. No new health concerns in the last year.    Past Medical History:   Patient Active Problem List   Diagnosis     Hyperlipidemia LDL goal <100     Past Medical History:   Diagnosis Date     Abnormal Pap smear      SAMUEL (generalised anxiety disorder)      Herniated disc, cervical      Hyperlipidemia 2015     TMJ arthralgia      Vitamin D insufficiency 2015    level 21     Past Surgical History:   Procedure Laterality Date     DILATION AND CURETTAGE      X3 for uterine polyp. 2001, 2008, 2014       Social History:  Not addressed at this visit.    Family History:  Family history of BCC and SCC in her mother and father.    Medications:  Current Outpatient Prescriptions   Medication Sig Dispense Refill     VITAMIN D, CHOLECALCIFEROL, PO Take 1 tablet by mouth daily       Allergies   Allergen Reactions     Tretinoin Other (See Comments)     Bad dreams         Review of Systems:  -As per HPI  -Constitutional: The patient  denies fatigue, fevers, chills, unintended weight loss, and night sweats.  -HEENT: Patient denies nonhealing oral sores.  -Skin: As above in HPI. No additional skin concerns.    Physical exam:  Vitals: LMP 03/05/2015  GEN: This is a well developed, well-nourished female in no acute distress, in a pleasant mood.    SKIN: Total skin excluding the undergarment areas was performed. The exam included the head/face, neck, both arms, chest, back, abdomen, both legs, digits and/or nails.   -Multiple pinpoint white papules on face including right chin  -Multiple benign appearing nevi and cherry angiomas  -No other lesions of concern on areas examined.     Impression/Plan:  1. Milia on face, including one on right chin which is slightly deeper    Okay to have removed by     2. Multiple benign nevi and cherry angiomas    CC Dr. Luna on close of this encounter.  Follow-up in 1 year, earlier for new or changing lesions.     Staff Involved:  Scribed by Dione Pulido, MS4 for Dr. Stein.    .The medical student acted as scribe for this encounter.  The encounter documented above was completely performed by myself.  Marcia Valdez MD

## 2017-11-14 NOTE — MR AVS SNAPSHOT
After Visit Summary   11/14/2017    Alicia Rivas    MRN: [id number]           Patient Information     Date Of Birth          1965        Visit Information        Provider Department      11/14/2017 8:30 AM Marcia Valdez MD [facility] Dermatology        Today's Diagnoses     Milia    -  1    Multiple nevi           Follow-ups after your visit        Who to contact     Please call your clinic at [phone] to:    Ask questions about your health    Make or cancel appointments    Discuss your medicines    Learn about your test results    Speak to your doctor   If you have compliments or concerns about an experience at your clinic, or if you wish to file a complaint, please contact [facility] Patient Relations at [phone] or email us at [email or web address]         Additional Information About Your Visit        MyChart Information     [employer] gives you secure access to your electronic health record. If you see a primary care provider, you can also send messages to your care team and make appointments. If you have questions, please call your primary care clinic.  If you do not have a primary care provider, please call [phone] and they will assist you.      [employer] is an electronic gateway that provides easy, online access to your medical records. With [employer], you can request a clinic appointment, read your test results, renew a prescription or communicate with your care team.     To access your existing account, please contact your [facility] or call [phone] for assistance.        Care EveryWhere ID     This is your Care EveryWhere ID. This could be used by other organizations to access your [facility] medical records  [id number]        Your Vitals Were     Last Period                   03/05/2015            Blood Pressure from Last 3 Encounters:   05/18/17 110/74   04/20/17 118/79   04/19/16 111/71     Weight from Last 3 Encounters:   05/18/17 70.3 kg (155 lb)   04/20/17 70.1 kg (154 lb 8 oz)   04/19/16 74.1 kg (163 lb 6.4 oz)              Today, you had the following     No orders found for display       Primary Care Provider    None Specified       No primary provider on file.        Equal Access to Services     [facility] Hospital[facility]NA : Hadii jesus Duncan, waradha albert, teri kaalmajackie ritter, ekta vogel . So [facility] [phone].    ATENCIÓN: Si habla español, tiene a brown disposición servicios gratuitos de asistencia lingüística. Jensename al [phone].    We comply with applicable federal civil rights laws and [place] laws. We do not discriminate on the basis of race, color, national origin, age, disability, sex, sexual orientation, or gender identity.            Thank you!     Thank you for choosing [facility] DERMATOLOGY  for your care. Our goal is always to provide you with excellent care. Hearing back from our patients is one way we can continue to improve our services. Please take a few minutes to complete the written survey that you may receive in the mail after your visit with us. Thank you!             Your Updated Medication List - Protect others around you: Learn how to safely use, store and throw away your medicines at www.disposemymeds.org.          This list is accurate as of: 11/14/17 11:59 PM.  Always use your most recent med list.                   Brand Name Dispense Instructions for use Diagnosis    VITAMIN D (CHOLECALCIFEROL) PO      Take 1 tablet by mouth daily    Hyperlipidemia LDL goal <100

## 2017-11-14 NOTE — PROGRESS NOTES
[facility] Dermatology Note      Dermatology Problem List:  1.Family history of SCC and BCC    Encounter Date: Nov 14, 2017    CC:  Chief Complaint   Patient presents with     Skin Check     Will is here today for a skin check. No concerns.          History of Present Illness:  Ms. Alicia Rivas is a 52 year old female who presents for a skin check. She has no concerns today but requested to have a nodule on her chin and the moles under her right breast examined. She had several gel manicures this summer which caused her nails to split, which has since resolved. She reports pain in her right elbow associated with overuse. Denies recent fevers, chills, rashes, cough, or joint swelling. No new health concerns in the last year.    Past Medical History:   Patient Active Problem List   Diagnosis     Hyperlipidemia LDL goal <100     Past Medical History:   Diagnosis Date     Abnormal Pap smear      SAMUEL (generalised anxiety disorder)      Herniated disc, cervical      Hyperlipidemia 2015     TMJ arthralgia      Vitamin D insufficiency 2015    level 21     Past Surgical History:   Procedure Laterality Date     DILATION AND CURETTAGE      X3 for uterine polyp. 2001, 2008, 2014       Social History:  Not addressed at this visit.    Family History:  Family history of BCC and SCC in her mother and father.    Medications:  Current Outpatient Prescriptions   Medication Sig Dispense Refill     VITAMIN D, CHOLECALCIFEROL, PO Take 1 tablet by mouth daily       Allergies   Allergen Reactions     Tretinoin Other (See Comments)     Bad dreams         Review of Systems:  -As per HPI  -Constitutional: The patient denies fatigue, fevers, chills, unintended weight loss, and night sweats.  -HEENT: Patient denies nonhealing oral sores.  -Skin: As above in HPI. No additional skin concerns.    Physical exam:  Vitals: LMP 03/05/2015  GEN: This is a well developed, well-nourished female in no acute distress, in a  pleasant mood.    SKIN: Total skin excluding the undergarment areas was performed. The exam included the head/face, neck, both arms, chest, back, abdomen, both legs, digits and/or nails.   -Multiple pinpoint white papules on face including right chin  -Multiple benign appearing nevi and cherry angiomas  -No other lesions of concern on areas examined.     Impression/Plan:  1. Milia on face, including one on right chin which is slightly deeper    Okay to have removed by     2. Multiple benign nevi and cherry angiomas    CC Dr. Luna on close of this encounter.  Follow-up in 1 year, earlier for new or changing lesions.     Staff Involved:  Scribed by Dione Pulido, MS4 for Dr. Stein.    .The medical student acted as scribe for this encounter.  The encounter documented above was completely performed by myself.  Marcia Valdez MD

## 2018-05-08 NOTE — PROGRESS NOTES
Alicia is a 52 year old female who presents for annual exam:   HPI:  Concerns:  - gained 15 pounds despite exercise [yoga and walk] but not maximized.  Eating is triggered by stress.  Knows what to do and can't do it:  Eats too much sugar - now trying to limit sugar. Worried about how this is affecting her health. Lots of life changes.   Wt Readings from Last 5 Encounters:   18 77.5 kg (170 lb 12.8 oz)   17 70.3 kg (155 lb)   17 70.1 kg (154 lb 8 oz)   16 74.1 kg (163 lb 6.4 oz)   16 75.8 kg (167 lb)   ROS:  General: frustrated over weight gain  Head/Eyes: none  Ears/Nose/Throat: eyes are alwaysdry  Cardiovascular: worries about cardiovascular health- was seen at the [facility] last year  Respiratory: none  Gastrointestinal: ok  Breast: none  Genitourinary: none  Sexual Function: none  Musculoskeletal: leg are restless during the night  Skin: none  Neurological: none  Mental Health:Family of origin issues has been stressful.  Parents are alive [father with mental illness] and mother with thyroid cancer].    Endocrine: none  OB/GYN HISTORY:   LMP 3.5.2015. Few hot flashes.   D & C for uterine Polyps X 3.  Obstetric History       T4      L4     SAB0   TAB0   Ectopic0   Multiple0   Live Births0       # Outcome Date GA Lbr Todd/2nd Weight Sex Delivery Anes PTL Lv   6 AB            5 AB            4 Term            3 Term            2 Term            1 Term               PAST MEDICAL HISTORY:  Past Medical History:   Diagnosis Date     Abnormal Pap smear      SAMUEL (generalised anxiety disorder)      Herniated disc, cervical      Hyperlipidemia 2015     TMJ arthralgia      Vitamin D insufficiency 2015    level 21   Life Style Modifiers:   Tobacco:  reports that she has never smoked. She has never used smokeless tobacco.   Alcohol:  reports that she drinks alcohol.   Drug use:  reports that she does not use illicit drugs.  Exercise: trying - 3-4 times a week    "           Diet: good general diet.  Limits carbs/sugar.  HCM: GYN [[facility]]. Pap smear ~ negative 2017;  Mammogram 5/2018  Supplements: none at this time  PAST SURGICAL HISTORY:  Past Surgical History:   Procedure Laterality Date     DILATION AND CURETTAGE      X3 for uterine polyp. 2001, 2008, 2014   FAMILY HISTORY:  No breast or ovarian disease  SOCIAL HISTORY:  Moved here in 2010 from [place].  Husbands work. 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 Living down [facility].  Four children. Moving back to [place] in July 2018.   with new job. One daughter at home.  Loves her job with [employer].   MEDICATIONS:  Current Outpatient Prescriptions   Medication Sig Dispense Refill     VITAMIN D, CHOLECALCIFEROL, PO Take 1 tablet by mouth daily     ALLERGIES:  Tretinoin  VITALS:  /71  Pulse 71  Ht 1.66 m (5' 5.35\")  Wt 77.5 kg (170 lb 12.8 oz)  LMP 03/05/2015  BMI 28.12 kg/m2  PHYSICAL EXAM:  Constitutional: Well appearing woman in no acute distress.   Psychological: appropriate mood.  Eyes: anicteric, normal extra-ocular movements.  Ears, Nose and Throat: tympanic membranes clear, nose clear and free of lesions, throat clear, moist mucous membrames, neck supple with full range of motion.    Neck: No thyroidmegaly.  Breast: symmetrical without abnormal masses and no nipple changes. No axillary lymphadenapthy  Cardiovascular: regular rate and rhythm, normal S1 and S2, no murmurs, rubs or gallops, peripheral pulses full and symmetric   Respiratory: clear to auscultation, no wheezes or crackles, normal breath sounds.  Musculoskeletal: full range of motion    Skin: no concerning lesions, no jaundice.  Neurological: normal gait, no tremor.   Diagnoses and associated orders for this visit:  Well woman exam without gynaecological exam  - Mammogram today  - PAP/HPV  2017 [call gyn for documentation]   - Immunizations up to date   - Weight management and exercise discussed - goal  " <160  Colon cancer screening  -     GASTROENTEROLOGY ADULT REF PROCEDURE ONLY  Screening for thyroid disorder  -     TSH  Screening for cholesterol level  -     Lipid Profile  Screening for diabetes mellitus  -     Basic Metabolic Panel  Vitamin D deficiency  -     25- OH-Vitamin D  Postmenopausal:   - Vitamin D 1-2000 IU daily  - Mindful eating: limit carbs and exercise keyana twice a week

## 2018-05-09 ENCOUNTER — RADIANT APPOINTMENT (OUTPATIENT)
Dept: MAMMOGRAPHY | Facility: CLINIC | Age: 53
End: 2018-05-09
Attending: FAMILY MEDICINE
Payer: COMMERCIAL

## 2018-05-09 ENCOUNTER — OFFICE VISIT (OUTPATIENT)
Dept: FAMILY MEDICINE | Facility: CLINIC | Age: 53
End: 2018-05-09
Attending: FAMILY MEDICINE
Payer: COMMERCIAL

## 2018-05-09 ENCOUNTER — TELEPHONE (OUTPATIENT)
Dept: GASTROENTEROLOGY | Facility: CLINIC | Age: 53
End: 2018-05-09

## 2018-05-09 VITALS
WEIGHT: 170.8 LBS | HEART RATE: 71 BPM | DIASTOLIC BLOOD PRESSURE: 71 MMHG | HEIGHT: 65 IN | BODY MASS INDEX: 28.46 KG/M2 | SYSTOLIC BLOOD PRESSURE: 107 MMHG

## 2018-05-09 DIAGNOSIS — Z13.220 SCREENING FOR CHOLESTEROL LEVEL: ICD-10-CM

## 2018-05-09 DIAGNOSIS — Z00.00 ANNUAL PHYSICAL EXAM: Primary | ICD-10-CM

## 2018-05-09 DIAGNOSIS — Z13.29 SCREENING FOR THYROID DISORDER: ICD-10-CM

## 2018-05-09 DIAGNOSIS — Z13.1 SCREENING FOR DIABETES MELLITUS: ICD-10-CM

## 2018-05-09 DIAGNOSIS — Z12.11 COLON CANCER SCREENING: ICD-10-CM

## 2018-05-09 DIAGNOSIS — Z12.31 VISIT FOR SCREENING MAMMOGRAM: ICD-10-CM

## 2018-05-09 DIAGNOSIS — E55.9 VITAMIN D DEFICIENCY: ICD-10-CM

## 2018-05-09 LAB
ANION GAP SERPL CALCULATED.3IONS-SCNC: 4 MMOL/L (ref 3–14)
BUN SERPL-MCNC: 13 MG/DL (ref 7–30)
CALCIUM SERPL-MCNC: 9.3 MG/DL (ref 8.5–10.1)
CHLORIDE SERPL-SCNC: 106 MMOL/L (ref 94–109)
CHOLEST SERPL-MCNC: 262 MG/DL
CO2 SERPL-SCNC: 33 MMOL/L (ref 20–32)
CREAT SERPL-MCNC: 0.62 MG/DL (ref 0.52–1.04)
DEPRECATED CALCIDIOL+CALCIFEROL SERPL-MC: 28 UG/L (ref 20–75)
GFR SERPL CREATININE-BSD FRML MDRD: >90 ML/MIN/1.7M2
GLUCOSE SERPL-MCNC: 101 MG/DL (ref 70–99)
HDLC SERPL-MCNC: 86 MG/DL
LDLC SERPL CALC-MCNC: 158 MG/DL
NONHDLC SERPL-MCNC: 176 MG/DL
POTASSIUM SERPL-SCNC: 4.5 MMOL/L (ref 3.4–5.3)
SODIUM SERPL-SCNC: 143 MMOL/L (ref 133–144)
TRIGL SERPL-MCNC: 89 MG/DL
TSH SERPL DL<=0.005 MIU/L-ACNC: 1.06 MU/L (ref 0.4–4)

## 2018-05-09 PROCEDURE — G0463 HOSPITAL OUTPT CLINIC VISIT: HCPCS | Mod: ZF

## 2018-05-09 PROCEDURE — 82306 VITAMIN D 25 HYDROXY: CPT | Performed by: FAMILY MEDICINE

## 2018-05-09 PROCEDURE — 80061 LIPID PANEL: CPT | Performed by: FAMILY MEDICINE

## 2018-05-09 PROCEDURE — 77067 SCR MAMMO BI INCL CAD: CPT

## 2018-05-09 PROCEDURE — 80048 BASIC METABOLIC PNL TOTAL CA: CPT | Performed by: FAMILY MEDICINE

## 2018-05-09 PROCEDURE — 84443 ASSAY THYROID STIM HORMONE: CPT | Performed by: FAMILY MEDICINE

## 2018-05-09 PROCEDURE — 36415 COLL VENOUS BLD VENIPUNCTURE: CPT | Performed by: FAMILY MEDICINE

## 2018-05-09 NOTE — NURSING NOTE
Chief Complaint   Patient presents with     Annual Visit     Wondering if she should be taking Calcium. Great than 2 years since LMP. No symptoms. 15 pound weight gain, increase in family stress. Moving to [place] soon.

## 2018-05-09 NOTE — MR AVS SNAPSHOT
After Visit Summary   5/9/2018    Alicia Rivas    MRN: [id number]           Patient Information     Date Of Birth          1965        Visit Information        Provider Department      5/9/2018 8:40 AM Chari Luna MD [facility]        Today's Diagnoses     Annual physical exam    -  1    Colon cancer screening        Screening for thyroid disorder        Screening for cholesterol level        Screening for diabetes mellitus        Vitamin D deficiency          Care Instructions    Col[facility]oscopy    [facility] [phone].      [facility] [phone]    [facility]: [phone] endoscopy    [facility]: [phone]          Follow-ups after your visit        Additional Services     GASTROENTEROLOGY ADULT REF PROCEDURE ONLY       Last Lab Result: Creatinine (mg/dL)       Date                     Value                 04/20/2017               0.64             ----------  Body mass index is 28.12 kg/(m^2).     Needed:  No  Language:  English    Patient will be contacted to schedule procedure.     Please be aware that coverage of these services is subject to the terms and limitations of your health insurance plan.  Call member services at your health plan with any benefit or coverage questions.  Any procedures must be performed at a [facility] facility OR coordinated by your clinic's referral office.    Please bring the following with you to your appointment:    (1) Any X-Rays, CTs or MRIs which have been performed.  Contact the facility where they were done to arrange for  prior to your scheduled appointment.    (2) List of current medications   (3) This referral request   (4) Any documents/labs given to you for this referral                  Who to contact     Please call your clinic at [phone] to:    Ask questions about your health    Make or cancel appointments    Discuss your medicines    Learn about  "your test results    Speak to your doctor            Additional Information About Your Visit        [employer] Recomendahart Information     slinkset is an electronic gateway that provides easy, online access to your medical records. With slinkset, you can request a clinic appointment, read your test results, renew a prescription or communicate with your care team.     To sign up for slinkset visit the website at [email or web address]   You will be asked to enter the access code listed below, as well as some personal information. Please follow the directions to create your username and password.     Your access code is: [email or web address]  Expires: 2018  9:31 AM     Your access code will  in 90 days. If you need help or a new code, please contact your [facility] or call [phone] for assistance.        Care EveryWhere ID     This is your Care EveryWhere ID. This could be used by other organizations to access your [facility] medical records  [id number]        Your Vitals Were     Pulse Height Last Period BMI (Body Mass Index)          71 1.66 m (5' 5.35\") 2015 28.12 kg/m2         Blood Pressure from Last 3 Encounters:   18 107/71   17 110/74   17 118/79    Weight from Last 3 Encounters:   18 77.5 kg (170 lb 12.8 oz)   17 70.3 kg (155 lb)   17 70.1 kg (154 lb 8 oz)              We Performed the Following     25- OH-Vitamin D     Basic Metabolic Panel     GASTROENTEROLOGY ADULT REF PROCEDURE ONLY     Lipid Profile     TSH        Primary Care Provider    None Specified       No primary provider on file.        Equal Access to Services     [facility]: Hadii jesus Duncan, wavalenciada karenadaha, qaybta ekta mitchell . So [facility] [phone].    ATENCIÓN: Si habla español, tiene a brown disposición servicios gratuitos de asistencia lingüística. Llame al [phone].    We comply with applicable federal civil " rights laws and [place] laws. We do not discriminate on the basis of race, color, national origin, age, disability, sex, sexual orientation, or gender identity.            Thank you!     Thank you for choosing [facility]  for your care. Our goal is always to provide you with excellent care. Hearing back from our patients is one way we can continue to improve our services. Please take a few minutes to complete the written survey that you may receive in the mail after your visit with us. Thank you!             Your Updated Medication List - Protect others around you: Learn how to safely use, store and throw away your medicines at www.disposemymeds.org.          This list is accurate as of 5/9/18  9:31 AM.  Always use your most recent med list.                   Brand Name Dispense Instructions for use Diagnosis    VITAMIN D (CHOLECALCIFEROL) PO      Take 1 tablet by mouth daily    Hyperlipidemia LDL goal <100

## 2018-05-09 NOTE — LETTER
2018       RE: Alicia Rivas  [address]  [place] [address]     Dear Colleague,    Thank you for referring your patient, Alicia Rivas, to the [facility] at [facility]. Please see a copy of my visit note below.    Alicia is a 52 year old female who presents for annual exam:   HPI:  Concerns:  - gained 15 pounds despite exercise [yoga and walk] but not maximized.  Eating is triggered by stress.  Knows what to do and can't do it:  Eats too much sugar - now trying to limit sugar. Worried about how this is affecting her health. Lots of life changes.   Wt Readings from Last 5 Encounters:   18 77.5 kg (170 lb 12.8 oz)   17 70.3 kg (155 lb)   17 70.1 kg (154 lb 8 oz)   16 74.1 kg (163 lb 6.4 oz)   16 75.8 kg (167 lb)   ROS:  General: frustrated over weight gain  Head/Eyes: none  Ears/Nose/Throat: eyes are alwaysdry  Cardiovascular: worries about cardiovascular health- was seen at the [facility] clinic last year  Respiratory: none  Gastrointestinal: ok  Breast: none  Genitourinary: none  Sexual Function: none  Musculoskeletal: leg are restless during the night  Skin: none  Neurological: none  Mental Health:Family of origin issues has been stressful.  Parents are alive [father with mental illness] and mother with thyroid cancer].    Endocrine: none  OB/GYN HISTORY:   LMP 3.5.. Few hot flashes.   D & C for uterine Polyps X 3.  Obstetric History       T4      L4     SAB0   TAB0   Ectopic0   Multiple0   Live Births0       # Outcome Date GA Lbr Todd/2nd Weight Sex Delivery Anes PTL Lv   6 AB            5 AB            4 Term            3 Term            2 Term            1 Term               PAST MEDICAL HISTORY:  Past Medical History:   Diagnosis Date     Abnormal Pap smear      SAMUEL (generalised anxiety disorder)      Herniated disc, cervical      Hyperlipidemia       "TMJ arthralgia      Vitamin D insufficiency 2015    level 21   Life Style Modifiers:   Tobacco:  reports that she has never smoked. She has never used smokeless tobacco.   Alcohol:  reports that she drinks alcohol.   Drug use:  reports that she does not use illicit drugs.  Exercise: trying - 3-4 times a week              Diet: good general diet.  Limits carbs/sugar.  HCM: GYN [[facility]]. Pap smear ~ negative 2017;  Mammogram 5/2018  Supplements: none at this time  PAST SURGICAL HISTORY:  Past Surgical History:   Procedure Laterality Date     DILATION AND CURETTAGE      X3 for uterine polyp. 2001, 2008, 2014   FAMILY HISTORY:  No breast or ovarian disease  SOCIAL HISTORY:  Moved here in 2010 from [place].  Husbands work. 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 Living down [facility].  Four children. Moving back to [place] in July 2018.   with new job. One daughter at home.  Loves her job with [employer].   MEDICATIONS:  Current Outpatient Prescriptions   Medication Sig Dispense Refill     VITAMIN D, CHOLECALCIFEROL, PO Take 1 tablet by mouth daily     ALLERGIES:  Tretinoin  VITALS:  /71  Pulse 71  Ht 1.66 m (5' 5.35\")  Wt 77.5 kg (170 lb 12.8 oz)  LMP 03/05/2015  BMI 28.12 kg/m2  PHYSICAL EXAM:  Constitutional: Well appearing woman in no acute distress.   Psychological: appropriate mood.  Eyes: anicteric, normal extra-ocular movements.  Ears, Nose and Throat: tympanic membranes clear, nose clear and free of lesions, throat clear, moist mucous membrames, neck supple with full range of motion.    Neck: No thyroidmegaly.  Breast: symmetrical without abnormal masses and no nipple changes. No axillary lymphadenapthy  Cardiovascular: regular rate and rhythm, normal S1 and S2, no murmurs, rubs or gallops, peripheral pulses full and symmetric   Respiratory: clear to auscultation, no wheezes or crackles, normal breath sounds.  Musculoskeletal: full range of motion    Skin: no " concerning lesions, no jaundice.  Neurological: normal gait, no tremor.   Diagnoses and associated orders for this visit:  Well woman exam without gynaecological exam  - Mammogram today  - PAP/HPV  2017 [call gyn for documentation]   - Immunizations up to date   - Weight management and exercise discussed - goal  <160  Colon cancer screening  -     GASTROENTEROLOGY ADULT REF PROCEDURE ONLY  Screening for thyroid disorder  -     TSH  Screening for cholesterol level  -     Lipid Profile  Screening for diabetes mellitus  -     Basic Metabolic Panel  Vitamin D deficiency  -     25- OH-Vitamin D  Postmenopausal:   - Vitamin D 1-2000 IU daily  - Mindful eating: limit carbs and exercise keyana twice a week      Again, thank you for allowing me to participate in the care of your patient.      Sincerely,    Chari Luna MD

## 2018-05-09 NOTE — PATIENT INSTRUCTIONS
Colonoscopy    [facility] [phone].      [facility] [phone]    [facility]: [phone] endoscopy    [facility]: [phone]

## 2018-06-20 ENCOUNTER — TELEPHONE (OUTPATIENT)
Dept: GASTROENTEROLOGY | Facility: OUTPATIENT CENTER | Age: 53
End: 2018-06-20

## 2018-06-22 ENCOUNTER — TELEPHONE (OUTPATIENT)
Dept: GASTROENTEROLOGY | Facility: OUTPATIENT CENTER | Age: 53
End: 2018-06-22

## 2019-06-29 ENCOUNTER — OFFICE VISIT (OUTPATIENT)
Dept: URGENT CARE | Facility: URGENT CARE | Age: 54
End: 2019-06-29
Payer: COMMERCIAL

## 2019-06-29 VITALS
SYSTOLIC BLOOD PRESSURE: 106 MMHG | DIASTOLIC BLOOD PRESSURE: 70 MMHG | TEMPERATURE: 98.4 F | HEART RATE: 65 BPM | WEIGHT: 168.6 LBS | OXYGEN SATURATION: 98 % | BODY MASS INDEX: 27.75 KG/M2 | RESPIRATION RATE: 20 BRPM

## 2019-06-29 DIAGNOSIS — S99.921A INJURY OF TOE ON RIGHT FOOT, INITIAL ENCOUNTER: Primary | ICD-10-CM

## 2019-06-29 PROCEDURE — 99213 OFFICE O/P EST LOW 20 MIN: CPT | Mod: 25 | Performed by: FAMILY MEDICINE

## 2019-06-29 PROCEDURE — 29550 STRAPPING OF TOES: CPT | Performed by: FAMILY MEDICINE

## 2019-06-29 NOTE — PROGRESS NOTES
SUBJECTIVE:  Alicia Rivas is a 54 year old female who sustained a right toe, 4nd toe injury 1-2 days ago. Mechanism of injury: not sure . Immediate symptoms: immediate pain, delayed pain. Symptoms have been gradual since that time. Prior history of related problems: no prior problems with this area in the past.    Patient reports her right fourth toe pain and swelling puffy she reports yesterday was more tender this morning is only swelling she has no pain.  She reports she had injured it, however she is not sure if it was the small toe or the big toe.    Denies any other injury does not wear tight shoes or hot heel.  OBJECTIVE:  Vital signs as noted above.  Appearance: in no apparent distress.  Foot exam: ecchymoses at 4th toe, swelling  Not tender to touch, nail intact., good sensation.  ankle joint is intact.    X-ray: Pt. declined    ASSESSMENT:  Toe contusion  She may have contusion and may have a fracture.  I did offer to do an x-ray however she has declined.    I did corine tape her toe with her small toe.  She was given instruction to keep it elevated ice it.     Citlali Beltran MD.

## 2019-06-29 NOTE — PATIENT INSTRUCTIONS
Patient Education     Treatment for Turf Toe  Turf toe is a sprain of the largest joint of the big toe. It happens when the toe bends up too far. This can happen when pushing off the foot to run.  Turf toe became more common when football players began playing on artificial turf instead of grass. Artificial turf is harder than grass. It s a common injury for athletes who play football or other sports on artificial turf. But it can happen in a wide variety of sports and activities.  Types of treatment  Treatment depends on how severe the injury is. A mild injury can be treated with:    Resting from the activity that caused the injury    Using cold packs for 20 minutes at a time, several times a day    Using an elastic compression band to help prevent more swelling    Raising your leg when sitting to avoid swelling    Taking over-the-counter pain medicine  A more severe injury may be treated with:    A walking boot or hard shoe. This is used to help keep the toe joint from moving or bending. It s worn for a week or so for a moderate injury. It s worn for a longer time with a more severe injury.    A cast. This helps to keep the foot from moving or bending. It s usually worn for at least several weeks.    Surgery. You may need surgery if you have a severe tear of tissues in the joint, the joint is unstable, or the injury doesn t heal well.    Physical therapy. These are exercises that can help stretch and strengthen your big toe as it heals.  Possible complications of turf toe  Turf toe may cause long-term stiffness and pain in your joint. Physical therapy exercises may help prevent stiffness and pain.  Getting back to your sport  If you are an athlete, talk with your healthcare provider and trainers to see when you might be ready to return to your sport. It s important not to return to your activity too soon, because this increases your chances of injuring your toe again. People with mild injuries may be able to  play soon after the injury. If you have a more severe injury, you may need to be out of play from weeks to months. Full recovery may take up to a year.  Your healthcare provider or  can suggest appropriate shoes and shoe inserts. These will help to give your foot more support. You may also be told to tape your big toe to your other toes. This can give extra support.     When to call your healthcare provider  Call your healthcare provider if you have any of the following:    Symptoms that don t get better or get worse    High fever    Numbness in the toe    Toe that feels cold    Toe that looks pale   Date Last Reviewed: 10/1/2017    6732-0941 [employer]. [address] [place] [address], [place] [id number]. All rights reserved. This information is not intended as a substitute for professional medical care. Always follow your healthcare professional's instructions.

## 2019-11-06 ENCOUNTER — HEALTH MAINTENANCE LETTER (OUTPATIENT)
Age: 54
End: 2019-11-06

## 2020-11-29 ENCOUNTER — HEALTH MAINTENANCE LETTER (OUTPATIENT)
Age: 55
End: 2020-11-29

## 2021-02-14 ENCOUNTER — HEALTH MAINTENANCE LETTER (OUTPATIENT)
Age: 56
End: 2021-02-14

## 2021-09-19 ENCOUNTER — HEALTH MAINTENANCE LETTER (OUTPATIENT)
Age: 56
End: 2021-09-19

## 2022-01-09 ENCOUNTER — HEALTH MAINTENANCE LETTER (OUTPATIENT)
Age: 57
End: 2022-01-09

## 2022-03-06 ENCOUNTER — HEALTH MAINTENANCE LETTER (OUTPATIENT)
Age: 57
End: 2022-03-06

## 2022-11-21 ENCOUNTER — HEALTH MAINTENANCE LETTER (OUTPATIENT)
Age: 57
End: 2022-11-21

## 2023-04-16 ENCOUNTER — HEALTH MAINTENANCE LETTER (OUTPATIENT)
Age: 58
End: 2023-04-16

## 2024-06-23 ENCOUNTER — HEALTH MAINTENANCE LETTER (OUTPATIENT)
Age: 59
End: 2024-06-23